# Patient Record
Sex: FEMALE | Race: BLACK OR AFRICAN AMERICAN | NOT HISPANIC OR LATINO | URBAN - METROPOLITAN AREA
[De-identification: names, ages, dates, MRNs, and addresses within clinical notes are randomized per-mention and may not be internally consistent; named-entity substitution may affect disease eponyms.]

---

## 2021-08-18 ENCOUNTER — APPOINTMENT (RX ONLY)
Dept: URBAN - METROPOLITAN AREA CLINIC 44 | Facility: CLINIC | Age: 46
Setting detail: DERMATOLOGY
End: 2021-08-18

## 2021-08-18 ENCOUNTER — APPOINTMENT (RX ONLY)
Dept: URBAN - METROPOLITAN AREA CLINIC 45 | Facility: CLINIC | Age: 46
Setting detail: DERMATOLOGY
End: 2021-08-18

## 2021-08-18 DIAGNOSIS — L21.8 OTHER SEBORRHEIC DERMATITIS: ICD-10-CM | Status: INADEQUATELY CONTROLLED

## 2021-08-18 PROCEDURE — 99204 OFFICE O/P NEW MOD 45 MIN: CPT

## 2021-08-18 PROCEDURE — ? PATIENT EDUCATION

## 2021-08-18 PROCEDURE — ? COUNSELING

## 2021-08-18 PROCEDURE — ? PHOTO-DOCUMENTATION

## 2021-08-18 PROCEDURE — ? TREATMENT REGIMEN

## 2021-08-18 PROCEDURE — ? PRESCRIPTION

## 2021-08-18 RX ORDER — FLUOCINOLONE ACETONIDE 0.11 MG/ML
SMALL AMOUNT OIL TOPICAL
Qty: 3 | Refills: 3 | Status: ERX | COMMUNITY
Start: 2021-08-18

## 2021-08-18 RX ORDER — CLOBETASOL PROPIONATE 0.5 MG/G
THIN LAYER CREAM TOPICAL QD
Qty: 1 | Refills: 0 | Status: ERX | COMMUNITY
Start: 2021-08-18

## 2021-08-18 RX ORDER — KETOCONAZOLE 20 MG/ML
SMALL AMOUNT SHAMPOO, SUSPENSION TOPICAL
Qty: 1 | Refills: 3 | Status: ERX | COMMUNITY
Start: 2021-08-18

## 2021-08-18 RX ADMIN — FLUOCINOLONE ACETONIDE SMALL AMOUNT: 0.11 OIL TOPICAL at 00:00

## 2021-08-18 RX ADMIN — CLOBETASOL PROPIONATE THIN LAYER: 0.5 CREAM TOPICAL at 00:00

## 2021-08-18 RX ADMIN — KETOCONAZOLE SMALL AMOUNT: 20 SHAMPOO, SUSPENSION TOPICAL at 00:00

## 2021-08-18 ASSESSMENT — LOCATION ZONE DERM
LOCATION ZONE: SCALP
LOCATION ZONE: FACE
LOCATION ZONE: FACE
LOCATION ZONE: SCALP

## 2021-08-18 ASSESSMENT — LOCATION SIMPLE DESCRIPTION DERM
LOCATION SIMPLE: RIGHT FOREHEAD
LOCATION SIMPLE: LEFT FOREHEAD
LOCATION SIMPLE: SCALP
LOCATION SIMPLE: SCALP
LOCATION SIMPLE: RIGHT FOREHEAD
LOCATION SIMPLE: LEFT FOREHEAD

## 2021-08-18 ASSESSMENT — SEVERITY ASSESSMENT
HOW SEVERE IS THIS PATIENT'S CONDITION?: MODERATE
HOW SEVERE IS THIS PATIENT'S CONDITION?: MODERATE

## 2021-08-18 ASSESSMENT — LOCATION DETAILED DESCRIPTION DERM
LOCATION DETAILED: LEFT FOREHEAD
LOCATION DETAILED: LEFT SUPERIOR PARIETAL SCALP
LOCATION DETAILED: LEFT SUPERIOR PARIETAL SCALP
LOCATION DETAILED: RIGHT FOREHEAD
LOCATION DETAILED: RIGHT FOREHEAD
LOCATION DETAILED: LEFT FOREHEAD

## 2021-08-18 NOTE — PROCEDURE: TREATMENT REGIMEN
Discontinue Regimen: Keisha water\\nGrape seed oil
Initiate Treatment: Ketoconazole 2% shampoo x once a wk\\nDerma-Smoothe/FS scalp oil 0.01% x once a week.\\nClobetasol 0.05% cream qd x 4 weeks to scalp only
Detail Level: Simple
Plan: Very common condition from overgrowth of yeast and increase in oil production\\nSkin is inflamed and has creeped to the forehead \\nPt states has worsened after starting her locs\\nStart Ketoconazole 2% shampoo x once a wk, apply small amount to wet scalp, lather, let sit 3-5 mins, rinse and follow with regular shampoo and conditioner \\nStart Derma-Smoothe/FS scalp oil 0.01% x once a week. Apply small amount to scalp at night, cover with shower cap, and wash the next morning. Do treatment the night before medicated wash\\nStart Clobetasol 0.05% cream qd x 4 weeks to scalp only\\nRTC 4 weeks

## 2021-08-18 NOTE — HPI: ITCHING (SCALP)
How Did The Scalp Condition Occur?: gradual in onset  (over a period of years)
How Severe Is The Scalp Condition?: moderate
Additional History: Pt c/o scalp itching x yrs that is flaking. Pt denies rash.

## 2021-08-18 NOTE — PROCEDURE: TREATMENT REGIMEN
Detail Level: Simple
Discontinue Regimen: Janiya water\\nGrape seed oil
Initiate Treatment: Ketoconazole 2% shampoo x once a wk\\nDerma-Smoothe/FS scalp oil 0.01% x once a week.\\nClobetasol 0.05% cream qd x 4 weeks to scalp only
Plan: Very common condition from overgrowth of yeast and increase in oil production\\nSkin is inflamed and has creeped to the forehead \\nPt states has worsened after starting her locs\\nStart Ketoconazole 2% shampoo x once a wk, apply small amount to wet scalp, lather, let sit 3-5 mins, rinse and follow with regular shampoo and conditioner \\nStart Derma-Smoothe/FS scalp oil 0.01% x once a week. Apply small amount to scalp at night, cover with shower cap, and wash the next morning. Do treatment the night before medicated wash\\nStart Clobetasol 0.05% cream qd x 4 weeks to scalp only\\nRTC 4 weeks

## 2021-10-08 ENCOUNTER — APPOINTMENT (RX ONLY)
Dept: URBAN - METROPOLITAN AREA CLINIC 46 | Facility: CLINIC | Age: 46
Setting detail: DERMATOLOGY
End: 2021-10-08

## 2021-10-08 DIAGNOSIS — L21.8 OTHER SEBORRHEIC DERMATITIS: ICD-10-CM | Status: IMPROVED

## 2021-10-08 PROCEDURE — ? COUNSELING

## 2021-10-08 PROCEDURE — ? TREATMENT REGIMEN

## 2021-10-08 PROCEDURE — 99213 OFFICE O/P EST LOW 20 MIN: CPT

## 2021-10-08 ASSESSMENT — LOCATION SIMPLE DESCRIPTION DERM
LOCATION SIMPLE: SCALP
LOCATION SIMPLE: RIGHT FOREHEAD
LOCATION SIMPLE: LEFT FOREHEAD

## 2021-10-08 ASSESSMENT — LOCATION ZONE DERM
LOCATION ZONE: SCALP
LOCATION ZONE: FACE

## 2021-10-08 ASSESSMENT — SEVERITY ASSESSMENT: HOW SEVERE IS THIS PATIENT'S CONDITION?: ALMOST CLEAR

## 2021-10-08 ASSESSMENT — LOCATION DETAILED DESCRIPTION DERM
LOCATION DETAILED: LEFT SUPERIOR PARIETAL SCALP
LOCATION DETAILED: LEFT FOREHEAD
LOCATION DETAILED: RIGHT FOREHEAD

## 2021-10-08 NOTE — PROCEDURE: TREATMENT REGIMEN
Plan: Scalp is improved today with some mild flaking. Advised patient that she can use the Ketoconazole Shampoo every two weeks. Continue with the DermaSmooth scalp Oil daily. Follow up in 6 months.
Detail Level: Zone
Continue Regimen: Derma Smoothe 0.1% scalp oil  once weekly
Modify Regimen: Use the  Clobetasol 0.05% cream 2-3 times weekly instead of daily\\nUse the Ketoconazole 2 % Shampoo every two weeks

## 2022-11-30 ENCOUNTER — APPOINTMENT (RX ONLY)
Dept: URBAN - METROPOLITAN AREA CLINIC 46 | Facility: CLINIC | Age: 47
Setting detail: DERMATOLOGY
End: 2022-11-30

## 2022-11-30 DIAGNOSIS — L30.4 ERYTHEMA INTERTRIGO: ICD-10-CM | Status: RESOLVED

## 2022-11-30 DIAGNOSIS — L21.8 OTHER SEBORRHEIC DERMATITIS: ICD-10-CM | Status: STABLE

## 2022-11-30 PROCEDURE — ? TREATMENT REGIMEN

## 2022-11-30 PROCEDURE — ? PRESCRIPTION

## 2022-11-30 PROCEDURE — 99213 OFFICE O/P EST LOW 20 MIN: CPT

## 2022-11-30 PROCEDURE — ? COUNSELING

## 2022-11-30 RX ORDER — KETOCONAZOLE 20 MG/ML
SMALL AMOUNT SHAMPOO, SUSPENSION TOPICAL
Qty: 120 | Refills: 3 | Status: ERX | COMMUNITY
Start: 2022-11-30

## 2022-11-30 RX ORDER — CLOBETASOL PROPIONATE 0.5 MG/G
THIN LAYER CREAM TOPICAL EVERY OTHER DAY
Qty: 30 | Refills: 4 | Status: ERX | COMMUNITY
Start: 2022-11-30

## 2022-11-30 RX ORDER — FLUOCINOLONE ACETONIDE 0.11 MG/ML
SMALL AMOUNT OIL TOPICAL
Qty: 118.28 | Refills: 4 | Status: ERX | COMMUNITY
Start: 2022-11-30

## 2022-11-30 RX ORDER — HYDROCORTISONE 25 MG/G
THIN LAYER CREAM TOPICAL
Qty: 30 | Refills: 0 | Status: ERX | COMMUNITY
Start: 2022-11-30

## 2022-11-30 RX ADMIN — HYDROCORTISONE THIN LAYER: 25 CREAM TOPICAL at 00:00

## 2022-11-30 RX ADMIN — KETOCONAZOLE SMALL AMOUNT: 20 SHAMPOO, SUSPENSION TOPICAL at 00:00

## 2022-11-30 RX ADMIN — CLOBETASOL PROPIONATE THIN LAYER: 0.5 CREAM TOPICAL at 00:00

## 2022-11-30 RX ADMIN — FLUOCINOLONE ACETONIDE SMALL AMOUNT: 0.11 OIL TOPICAL at 00:00

## 2022-11-30 ASSESSMENT — LOCATION SIMPLE DESCRIPTION DERM
LOCATION SIMPLE: ABDOMEN
LOCATION SIMPLE: RIGHT SCALP

## 2022-11-30 ASSESSMENT — LOCATION DETAILED DESCRIPTION DERM
LOCATION DETAILED: RIGHT RIB CAGE
LOCATION DETAILED: RIGHT MEDIAL FRONTAL SCALP
LOCATION DETAILED: LEFT RIB CAGE

## 2022-11-30 ASSESSMENT — LOCATION ZONE DERM
LOCATION ZONE: SCALP
LOCATION ZONE: TRUNK

## 2022-11-30 NOTE — PROCEDURE: TREATMENT REGIMEN
Plan: Discussed well controlled today. Continue current treatment regimen. Will send refills\\n\\nDiscussed to avoid applying heavy oils to the scalp as it can induce a flare. Discussed if a flare occurs to increase the frequency of medicated shampoo to every other week until resolved then go back to once monthly\\n\\nRTC 1 yr for refills
Detail Level: Simple
Continue Regimen: Derma-Smoothe/ FS Scalp Oil 0.01% once weekly\\nKetoconazole 2% shampoo once monthly\\nClobetasol 0.05% cream every other day
Plan: No rash present today\\n\\nDiscussed to use barrier cream and powders to prevent excessive moisture \\nWill prescribe topical steroid to use 1-2 x daily x 2 weeks prn flares\\nIf flare is worsening or not improving with topical steroid to incorporate an OTC anti-fungal topical
Initiate Treatment: Hydrocortisone 2.5% cream 1-2 x daily x 2 weeks prn flares

## 2024-03-06 ENCOUNTER — RX ONLY (OUTPATIENT)
Age: 49
Setting detail: RX ONLY
End: 2024-03-06

## 2024-03-06 ENCOUNTER — APPOINTMENT (RX ONLY)
Dept: URBAN - METROPOLITAN AREA CLINIC 46 | Facility: CLINIC | Age: 49
Setting detail: DERMATOLOGY
End: 2024-03-06

## 2024-03-06 DIAGNOSIS — L21.8 OTHER SEBORRHEIC DERMATITIS: ICD-10-CM | Status: WORSENING

## 2024-03-06 PROCEDURE — ? COUNSELING

## 2024-03-06 PROCEDURE — ? PRESCRIPTION

## 2024-03-06 PROCEDURE — 99214 OFFICE O/P EST MOD 30 MIN: CPT

## 2024-03-06 PROCEDURE — ? TREATMENT REGIMEN

## 2024-03-06 PROCEDURE — ? ADDITIONAL NOTES

## 2024-03-06 RX ORDER — CLOBETASOL PROPIONATE 0.5 MG/G
THIN LAYER CREAM TOPICAL EVERY OTHER DAY
Qty: 30 | Refills: 4 | Status: ERX

## 2024-03-06 RX ORDER — KETOCONAZOLE 20 MG/ML
SMALL AMOUNT SHAMPOO, SUSPENSION TOPICAL
Qty: 120 | Refills: 3 | Status: ERX

## 2024-03-06 RX ORDER — BETAMETHASONE VALERATE 1 MG/G
THIN LAYER CREAM TOPICAL BID
Qty: 45 | Refills: 3 | Status: ERX | COMMUNITY
Start: 2024-03-06

## 2024-03-06 RX ORDER — FLUOCINOLONE ACETONIDE 0.11 MG/ML
SMALL AMOUNT OIL TOPICAL
Qty: 118.28 | Refills: 4 | Status: ERX

## 2024-03-06 RX ADMIN — BETAMETHASONE VALERATE THIN LAYER: 1 CREAM TOPICAL at 00:00

## 2024-03-06 ASSESSMENT — LOCATION SIMPLE DESCRIPTION DERM
LOCATION SIMPLE: LEFT CHEEK
LOCATION SIMPLE: RIGHT SCALP

## 2024-03-06 ASSESSMENT — LOCATION DETAILED DESCRIPTION DERM
LOCATION DETAILED: RIGHT MEDIAL FRONTAL SCALP
LOCATION DETAILED: LEFT MEDIAL BUCCAL CHEEK

## 2024-03-06 ASSESSMENT — LOCATION ZONE DERM
LOCATION ZONE: SCALP
LOCATION ZONE: FACE

## 2024-03-06 NOTE — PROCEDURE: ADDITIONAL NOTES
Detail Level: Simple
Additional Notes: Pt states seb derm on scalp is well controlled with current regimen. Mild flaking on scalp present on exam day. However, she reports  a new dry itchy patch around mouth that worsens during the winter. She states that she usually applies hydrocortisone cream to area as needed which used to help, but has not found to be effective recently.\\n\\nWill prescribe stronger topical steroidal cream to use sparingly for no more than 2 weeks. If this does keep dryness at bay, contact office for non steroidal cream for maintenance.\\n\\nRTC yearly to f/u with skin exams
Render Risk Assessment In Note?: no

## 2024-03-06 NOTE — PROCEDURE: TREATMENT REGIMEN
Initiate Treatment: betamethasone valerate 0.1 % topical cream\\nApply a thin layer to affected areas on the face twice daily x 2 weeks prn flares
Detail Level: Simple
Continue Regimen: Derma-Smoothe/ FS Scalp Oil 0.01% once weekly\\nKetoconazole 2% shampoo once monthly\\nClobetasol 0.05% cream every other day\\n*all Rx refilled today

## 2024-08-22 ENCOUNTER — APPOINTMENT (RX ONLY)
Dept: URBAN - METROPOLITAN AREA CLINIC 41 | Facility: CLINIC | Age: 49
Setting detail: DERMATOLOGY
End: 2024-08-22

## 2024-08-22 DIAGNOSIS — D485 NEOPLASM OF UNCERTAIN BEHAVIOR OF SKIN: ICD-10-CM

## 2024-08-22 DIAGNOSIS — L81.4 OTHER MELANIN HYPERPIGMENTATION: ICD-10-CM

## 2024-08-22 PROBLEM — D48.5 NEOPLASM OF UNCERTAIN BEHAVIOR OF SKIN: Status: ACTIVE | Noted: 2024-08-22

## 2024-08-22 PROCEDURE — 99212 OFFICE O/P EST SF 10 MIN: CPT

## 2024-08-22 PROCEDURE — ? COUNSELING

## 2024-08-22 PROCEDURE — ? ADDITIONAL NOTES

## 2024-08-22 ASSESSMENT — LOCATION SIMPLE DESCRIPTION DERM
LOCATION SIMPLE: RIGHT INDEX FINGER
LOCATION SIMPLE: RIGHT INDEX FINGERNAIL

## 2024-08-22 ASSESSMENT — LOCATION ZONE DERM
LOCATION ZONE: FINGERNAIL
LOCATION ZONE: FINGER

## 2024-08-22 ASSESSMENT — LOCATION DETAILED DESCRIPTION DERM
LOCATION DETAILED: RIGHT INDEX FINGERTIP
LOCATION DETAILED: RIGHT INDEX FINGERNAIL

## 2024-08-22 NOTE — PROCEDURE: ADDITIONAL NOTES
Additional Notes: We thought about onychopapilloma but this does not check all the boxes for this. Patient has no scaling under the nail and no bump at the end of the nail. This also does not start at the end of the half moon area so a little unusual for an onychopapilloma. We can’t see the beginning of the streak. Also patient has freckles on her fingers and one under the break in the nail. This could be related to some sort of trauma to the root which could mean the nail will never grow thick there and always split. We could always go for a biopsy to see if we could get a definitive answer as to what is going on. Ways to biopsy would be to shave this area or numb the finger up and take a biopsy from the nail root. Discussed if this were something bad like a cancer this would be changing and evolving. Patient can go home and take a good picture of the nail to check every few months to see if there are any changes to the split get worse or the streak gets wider then patient will let us know. For the split it is happening because something is happening in the root of the nail. Patient could use a gel nail over top or a nail hardener to help support the nail grow out. Even if we did a biopsy the nail will heal with a scar which could still result in a split in the nail.
Detail Level: Simple
Render Risk Assessment In Note?: no

## 2024-08-22 NOTE — HPI: NAIL DISORDER
Is This A New Presentation, Or A Follow-Up?: Nail Disorder
How Severe Is It?: mild
Additional History: Patient says she has not used anything to treat this nail and she was not referred to us by someone else. This has been going on for over a year. Two years ago this was normal. She does not recall any trauma to the nail.

## 2025-08-14 ENCOUNTER — RX ONLY (RX ONLY)
Age: 50
End: 2025-08-14

## 2025-08-14 RX ORDER — CLOBETASOL PROPIONATE CREAM USP, 0.05% 0.5 MG/G
THIN LAYER CREAM TOPICAL EVERY OTHER DAY
Qty: 60 | Refills: 0 | Status: ERX